# Patient Record
Sex: MALE | URBAN - METROPOLITAN AREA
[De-identification: names, ages, dates, MRNs, and addresses within clinical notes are randomized per-mention and may not be internally consistent; named-entity substitution may affect disease eponyms.]

---

## 2023-06-19 ENCOUNTER — TELEPHONE (OUTPATIENT)
Dept: HEMATOLOGY/ONCOLOGY | Facility: CLINIC | Age: 63
End: 2023-06-19

## 2023-06-19 NOTE — TELEPHONE ENCOUNTER
----- Message from Yanet Maldonado sent at 6/19/2023  2:34 PM CDT -----  Regarding: Patient advice  Contact: Chelsea [phone]        Name of Caller: Chelsea with [facility]       Contact Preference: Phone: [phone]          Fax [phone]       Nature of Call:  Requesting NGS testing results

## 2024-07-22 ENCOUNTER — TELEPHONE (OUTPATIENT)
Dept: PAIN MEDICINE | Facility: CLINIC | Age: 64
End: 2024-07-22
Payer: OTHER GOVERNMENT